# Patient Record
Sex: MALE | Race: WHITE | NOT HISPANIC OR LATINO | URBAN - METROPOLITAN AREA
[De-identification: names, ages, dates, MRNs, and addresses within clinical notes are randomized per-mention and may not be internally consistent; named-entity substitution may affect disease eponyms.]

---

## 2018-03-26 ENCOUNTER — OFFICE (OUTPATIENT)
Dept: URBAN - METROPOLITAN AREA CLINIC 11 | Facility: CLINIC | Age: 83
End: 2018-03-26

## 2018-03-26 VITALS
HEART RATE: 50 BPM | WEIGHT: 173 LBS | HEIGHT: 67 IN | DIASTOLIC BLOOD PRESSURE: 69 MMHG | SYSTOLIC BLOOD PRESSURE: 146 MMHG

## 2018-03-26 DIAGNOSIS — R13.10 DYSPHAGIA, UNSPECIFIED: ICD-10-CM

## 2018-03-26 DIAGNOSIS — K59.00 CONSTIPATION, UNSPECIFIED: ICD-10-CM

## 2018-03-26 DIAGNOSIS — R15.9 FULL INCONTINENCE OF FECES: ICD-10-CM

## 2018-03-26 PROCEDURE — 99203 OFFICE O/P NEW LOW 30 MIN: CPT | Performed by: INTERNAL MEDICINE

## 2018-03-26 NOTE — SERVICEHPINOTES
Here with 6 months of occasional fecal seepage.  Long history of constipation with 4-5 days between stools.  Now takes a stool softener if he has been 2 days without a stool with good results with formed stools daily for 2-3 days.  Began stool softeners prior to onset of fecal seepage.  Seepage consists of small formed stool without blood or mucus.  Seepage most common after spending long time on his feet working or if he strains to lift something heavy.  Still having mid sternal solid dysphagia with little improvement after last dilatation with Dr. Corbin.

## 2018-03-26 NOTE — SERVICENOTES
Discussed colonoscopy (colonoscopy in  and sigmoidoscopy in ).  Friend  from colonoscopy.  If hemosure negative, plan for sigmoidoscopy.

## 2018-04-05 ENCOUNTER — OFFICE (OUTPATIENT)
Dept: URBAN - METROPOLITAN AREA CLINIC 11 | Facility: CLINIC | Age: 83
End: 2018-04-05
Payer: COMMERCIAL

## 2018-04-05 DIAGNOSIS — R13.10 DYSPHAGIA, UNSPECIFIED: ICD-10-CM

## 2018-04-05 PROCEDURE — 91010 ESOPHAGUS MOTILITY STUDY: CPT

## 2018-04-20 ENCOUNTER — OFFICE (OUTPATIENT)
Dept: URBAN - METROPOLITAN AREA CLINIC 11 | Facility: CLINIC | Age: 83
End: 2018-04-20
Payer: COMMERCIAL

## 2018-04-20 DIAGNOSIS — K59.00 CONSTIPATION, UNSPECIFIED: ICD-10-CM

## 2018-04-20 PROCEDURE — 82274 ASSAY TEST FOR BLOOD FECAL: CPT | Mod: QW | Performed by: INTERNAL MEDICINE

## 2018-05-21 ENCOUNTER — OFFICE (OUTPATIENT)
Dept: URBAN - METROPOLITAN AREA CLINIC 11 | Facility: CLINIC | Age: 83
End: 2018-05-21

## 2018-05-21 VITALS
SYSTOLIC BLOOD PRESSURE: 138 MMHG | WEIGHT: 173 LBS | HEIGHT: 67 IN | DIASTOLIC BLOOD PRESSURE: 67 MMHG | HEART RATE: 47 BPM

## 2018-05-21 DIAGNOSIS — K59.00 CONSTIPATION, UNSPECIFIED: ICD-10-CM

## 2018-05-21 DIAGNOSIS — R15.9 FULL INCONTINENCE OF FECES: ICD-10-CM

## 2018-05-21 DIAGNOSIS — K64.8 OTHER HEMORRHOIDS: ICD-10-CM

## 2018-05-21 PROCEDURE — 45330 DIAGNOSTIC SIGMOIDOSCOPY: CPT | Performed by: INTERNAL MEDICINE

## 2024-12-06 ENCOUNTER — AMBULATORY SURGICAL CENTER (OUTPATIENT)
Dept: URBAN - METROPOLITAN AREA SURGERY 3 | Facility: SURGERY | Age: 89
End: 2024-12-06
Payer: MEDICARE

## 2024-12-06 VITALS
DIASTOLIC BLOOD PRESSURE: 67 MMHG | WEIGHT: 169 LBS | TEMPERATURE: 97 F | HEART RATE: 63 BPM | SYSTOLIC BLOOD PRESSURE: 128 MMHG | RESPIRATION RATE: 13 BRPM | HEIGHT: 67 IN | SYSTOLIC BLOOD PRESSURE: 175 MMHG | OXYGEN SATURATION: 95 % | HEART RATE: 60 BPM | DIASTOLIC BLOOD PRESSURE: 65 MMHG | SYSTOLIC BLOOD PRESSURE: 104 MMHG | SYSTOLIC BLOOD PRESSURE: 114 MMHG | SYSTOLIC BLOOD PRESSURE: 117 MMHG | RESPIRATION RATE: 12 BRPM | DIASTOLIC BLOOD PRESSURE: 70 MMHG | HEART RATE: 79 BPM | RESPIRATION RATE: 16 BRPM | DIASTOLIC BLOOD PRESSURE: 75 MMHG | HEART RATE: 62 BPM | TEMPERATURE: 98.3 F | DIASTOLIC BLOOD PRESSURE: 95 MMHG | OXYGEN SATURATION: 96 % | HEART RATE: 66 BPM

## 2024-12-06 DIAGNOSIS — K22.2 ESOPHAGEAL OBSTRUCTION: ICD-10-CM

## 2024-12-06 DIAGNOSIS — K44.9 DIAPHRAGMATIC HERNIA WITHOUT OBSTRUCTION OR GANGRENE: ICD-10-CM

## 2024-12-06 PROCEDURE — 43249 ESOPH EGD DILATION <30 MM: CPT | Performed by: INTERNAL MEDICINE
